# Patient Record
Sex: MALE | Race: WHITE | NOT HISPANIC OR LATINO | URBAN - METROPOLITAN AREA
[De-identification: names, ages, dates, MRNs, and addresses within clinical notes are randomized per-mention and may not be internally consistent; named-entity substitution may affect disease eponyms.]

---

## 2024-06-23 ENCOUNTER — EMERGENCY (EMERGENCY)
Facility: HOSPITAL | Age: 49
LOS: 1 days | Discharge: HOME | End: 2024-06-23
Attending: EMERGENCY MEDICINE | Admitting: EMERGENCY MEDICINE
Payer: COMMERCIAL

## 2024-06-23 VITALS
OXYGEN SATURATION: 94 % | RESPIRATION RATE: 18 BRPM | DIASTOLIC BLOOD PRESSURE: 78 MMHG | WEIGHT: 300.05 LBS | HEIGHT: 74 IN | SYSTOLIC BLOOD PRESSURE: 114 MMHG | TEMPERATURE: 98 F | HEART RATE: 103 BPM

## 2024-06-23 VITALS
OXYGEN SATURATION: 98 % | DIASTOLIC BLOOD PRESSURE: 74 MMHG | SYSTOLIC BLOOD PRESSURE: 133 MMHG | RESPIRATION RATE: 18 BRPM | HEART RATE: 69 BPM | TEMPERATURE: 98 F

## 2024-06-23 DIAGNOSIS — R55 SYNCOPE AND COLLAPSE: ICD-10-CM

## 2024-06-23 DIAGNOSIS — E86.0 DEHYDRATION: ICD-10-CM

## 2024-06-23 LAB
ADD ON TEST-SPECIMEN IN LAB: SIGNIFICANT CHANGE UP
ANION GAP SERPL CALC-SCNC: 11 MMOL/L — SIGNIFICANT CHANGE UP (ref 5–17)
ANION GAP SERPL CALC-SCNC: 11 MMOL/L — SIGNIFICANT CHANGE UP (ref 5–17)
BASOPHILS # BLD AUTO: 0.02 K/UL — SIGNIFICANT CHANGE UP (ref 0–0.2)
BASOPHILS NFR BLD AUTO: 0.2 % — SIGNIFICANT CHANGE UP (ref 0–2)
BUN SERPL-MCNC: 16 MG/DL — SIGNIFICANT CHANGE UP (ref 7–23)
BUN SERPL-MCNC: 16 MG/DL — SIGNIFICANT CHANGE UP (ref 7–23)
CALCIUM SERPL-MCNC: 8.5 MG/DL — SIGNIFICANT CHANGE UP (ref 8.4–10.5)
CALCIUM SERPL-MCNC: 8.8 MG/DL — SIGNIFICANT CHANGE UP (ref 8.4–10.5)
CHLORIDE SERPL-SCNC: 106 MMOL/L — SIGNIFICANT CHANGE UP (ref 96–108)
CHLORIDE SERPL-SCNC: 108 MMOL/L — SIGNIFICANT CHANGE UP (ref 96–108)
CO2 SERPL-SCNC: 22 MMOL/L — SIGNIFICANT CHANGE UP (ref 22–31)
CO2 SERPL-SCNC: 25 MMOL/L — SIGNIFICANT CHANGE UP (ref 22–31)
CREAT SERPL-MCNC: 1.58 MG/DL — HIGH (ref 0.5–1.3)
CREAT SERPL-MCNC: 1.85 MG/DL — HIGH (ref 0.5–1.3)
EGFR: 44 ML/MIN/1.73M2 — LOW
EGFR: 53 ML/MIN/1.73M2 — LOW
EOSINOPHIL # BLD AUTO: 0.02 K/UL — SIGNIFICANT CHANGE UP (ref 0–0.5)
EOSINOPHIL NFR BLD AUTO: 0.2 % — SIGNIFICANT CHANGE UP (ref 0–6)
GLUCOSE SERPL-MCNC: 108 MG/DL — HIGH (ref 70–99)
GLUCOSE SERPL-MCNC: 141 MG/DL — HIGH (ref 70–99)
HCT VFR BLD CALC: 45.9 % — SIGNIFICANT CHANGE UP (ref 39–50)
HGB BLD-MCNC: 14.7 G/DL — SIGNIFICANT CHANGE UP (ref 13–17)
IMM GRANULOCYTES NFR BLD AUTO: 0.6 % — SIGNIFICANT CHANGE UP (ref 0–0.9)
LYMPHOCYTES # BLD AUTO: 1.19 K/UL — SIGNIFICANT CHANGE UP (ref 1–3.3)
LYMPHOCYTES # BLD AUTO: 9 % — LOW (ref 13–44)
MCHC RBC-ENTMCNC: 26.7 PG — LOW (ref 27–34)
MCHC RBC-ENTMCNC: 32 GM/DL — SIGNIFICANT CHANGE UP (ref 32–36)
MCV RBC AUTO: 83.5 FL — SIGNIFICANT CHANGE UP (ref 80–100)
MONOCYTES # BLD AUTO: 0.86 K/UL — SIGNIFICANT CHANGE UP (ref 0–0.9)
MONOCYTES NFR BLD AUTO: 6.5 % — SIGNIFICANT CHANGE UP (ref 2–14)
NEUTROPHILS # BLD AUTO: 11.04 K/UL — HIGH (ref 1.8–7.4)
NEUTROPHILS NFR BLD AUTO: 83.5 % — HIGH (ref 43–77)
NRBC # BLD: 0 /100 WBCS — SIGNIFICANT CHANGE UP (ref 0–0)
PLATELET # BLD AUTO: 250 K/UL — SIGNIFICANT CHANGE UP (ref 150–400)
POTASSIUM SERPL-MCNC: 4.3 MMOL/L — SIGNIFICANT CHANGE UP (ref 3.5–5.3)
POTASSIUM SERPL-MCNC: 4.3 MMOL/L — SIGNIFICANT CHANGE UP (ref 3.5–5.3)
POTASSIUM SERPL-SCNC: 4.3 MMOL/L — SIGNIFICANT CHANGE UP (ref 3.5–5.3)
POTASSIUM SERPL-SCNC: 4.3 MMOL/L — SIGNIFICANT CHANGE UP (ref 3.5–5.3)
RBC # BLD: 5.5 M/UL — SIGNIFICANT CHANGE UP (ref 4.2–5.8)
RBC # FLD: 12.4 % — SIGNIFICANT CHANGE UP (ref 10.3–14.5)
SODIUM SERPL-SCNC: 141 MMOL/L — SIGNIFICANT CHANGE UP (ref 135–145)
SODIUM SERPL-SCNC: 142 MMOL/L — SIGNIFICANT CHANGE UP (ref 135–145)
TROPONIN T, HIGH SENSITIVITY RESULT: 11 NG/L — SIGNIFICANT CHANGE UP (ref 0–51)
WBC # BLD: 13.21 K/UL — HIGH (ref 3.8–10.5)
WBC # FLD AUTO: 13.21 K/UL — HIGH (ref 3.8–10.5)

## 2024-06-23 PROCEDURE — 80048 BASIC METABOLIC PNL TOTAL CA: CPT

## 2024-06-23 PROCEDURE — 99285 EMERGENCY DEPT VISIT HI MDM: CPT

## 2024-06-23 PROCEDURE — 82962 GLUCOSE BLOOD TEST: CPT

## 2024-06-23 PROCEDURE — 99284 EMERGENCY DEPT VISIT MOD MDM: CPT | Mod: 25

## 2024-06-23 PROCEDURE — 84484 ASSAY OF TROPONIN QUANT: CPT

## 2024-06-23 PROCEDURE — 85025 COMPLETE CBC W/AUTO DIFF WBC: CPT

## 2024-06-23 PROCEDURE — 36415 COLL VENOUS BLD VENIPUNCTURE: CPT

## 2024-06-23 RX ORDER — SODIUM CHLORIDE 9 MG/ML
1000 INJECTION INTRAMUSCULAR; INTRAVENOUS; SUBCUTANEOUS ONCE
Refills: 0 | Status: COMPLETED | OUTPATIENT
Start: 2024-06-23 | End: 2024-06-23

## 2024-06-23 RX ADMIN — SODIUM CHLORIDE 1000 MILLILITER(S): 9 INJECTION INTRAMUSCULAR; INTRAVENOUS; SUBCUTANEOUS at 18:11

## 2024-06-23 NOTE — ED ADULT NURSE NOTE - OBJECTIVE STATEMENT
The patient is a 49y Male complaining of syncope. Pt is A&Ox 4 , speaking in full sentences, respirations unlabored. Pt reports syncopal-like episode at restaurant today, witnessed by friend who helped patient to a sitting position from standing, pt endorses feeling dizzy at the restaurant prior to episode. Pt denies head strike, use of blood thinners. no Pmhx. Pt now states " I feel fine". Pt denies CP, SOB, dizziness, changes in vision/speech, unilateral weakness, F/C, N/V/D.

## 2024-06-23 NOTE — ED PROVIDER NOTE - ATTENDING APP SHARED VISIT CONTRIBUTION OF CARE
The pt is a 48 y/o M, BIBA s/p passing out -- pt was walking around in the sun/heat all day, drank little water, then went to eat lunch and felt "lightheaded" - defines as "room vibrating", then went to stand up and according to friend got pale and diaphoretic - friend sat him back down, he was "out for a few sec" then came back. Currently feeling "tired". Denies cp, sob, n/v/d, abd pain, h/a, head trauma, fevers, chills. No PMH    Pt well appearing in ED  VSS  EKG nonischemic  Initial set of labs with cr 1.85, hydrated and repeated with improvement  Trop x 2 stable  Pt feeling much better  Symptoms most likely vasovagal/heat related  Will follow up with pmd  Understands return precautions and follow up

## 2024-06-23 NOTE — ED PROVIDER NOTE - OBJECTIVE STATEMENT
The pt is a 50 y/o M, BIBA s/p passing out -- pt was walking around in the sun/heat all day, drank little water, then went to eat lunch and felt "lightheaded" - defines as "room vibrating", then went to stand up and according to friend got pale and diaphoretic - friend sat him back down, he was "out for a few sec" then came back. Currently feeling "tired". Denies cp, sob, n/v/d, abd pain, h/a, head trauma, fevers, chills. No PMH

## 2024-06-23 NOTE — ED PROVIDER NOTE - NSFOLLOWUPINSTRUCTIONS_ED_ALL_ED_FT
Dehydration, Adult  Dehydration is a condition in which there is not enough water or other fluids in the body. This happens when a person loses more fluids than they take in. Important organs cannot work right without the right amount of fluids. Any loss of fluids from the body can cause dehydration.  Dehydration can be mild, worse, or very bad. It should be treated right away to keep it from getting very bad.  What are the causes?  Conditions that cause loss of water in the body. They include:  Watery poop (diarrhea).  Vomiting.  Sweating a lot.  Fever.  Infection.  Peeing (urinating) a lot.  Not drinking enough fluids.  Certain medicines, such as medicines that take extra fluid out of the body (diuretics).  Lack of safe drinking water.  Not being able to get enough water and food.  What increases the risk?  Having a long-term (chronic) illness that has not been treated the right way, such as:  Diabetes.  Heart disease.  Kidney disease.  Being 65 years of age or older.  Having a disability.  Living in a place that is high above the ground or sea (high in altitude). The thinner, drier air causes more fluid loss.  Doing exercises that put stress on your body for a long time.  Being active when in hot places.  What are the signs or symptoms?  Symptoms of dehydration depend on how bad it is.  Mild or worse dehydration  Thirst.  Dry lips or dry mouth.  Feeling dizzy or light-headed.  Muscle cramps.  Passing little pee or dark pee. Pee may be the color of tea.  Headache.  Very bad dehydration  Changes in skin. Skin may:  Be cold to the touch (clammy).  Be blotchy or pale.  Not go back to normal right after you pinch it and let it go.  Little or no tears, pee, or sweat.  Fast breathing.  Low blood pressure.  Weak pulse.  Pulse that is more than 100 beats a minute when you are sitting still.  Other changes, such as:  Feeling very thirsty.  Eyes that look hollow (sunken).  Cold hands and feet.  Being confused.  Being very tired (lethargic) or having trouble waking from sleep.  Losing weight.  Loss of consciousness.  How is this treated?  A person's hand, showing an inserted IV catheter.   Treatment for this condition depends on how bad your dehydration is. Treatment should start right away. Do not wait until your condition gets very bad. Very bad dehydration is an emergency. You will need to go to a hospital.  Mild or worse dehydration can be treated at home. You may be asked to:  Drink more fluids.  Drink an oral rehydration solution (ORS). This drink gives you the right amount of fluids, salts, and minerals (electrolytes).  Very bad dehydration can be treated:  With fluids through an IV tube.  By correcting low levels of electrolytes in the body.  By treating the problem that caused your dehydration.  Follow these instructions at home:  Oral rehydration solution  A glass of water with a spoonful of ORS ready to be added to it.  If told by your doctor, drink an ORS:  Make an ORS. Use instructions on the package.  Start by drinking small amounts, about ½ cup (120 mL) every 5–10 minutes.  Slowly drink more until you have had the amount that your doctor said to have.  Eating and drinking  A person drinking water from a glass.   Drink enough clear fluid to keep your pee pale yellow. If you were told to drink an ORS, finish the ORS first. Then, start slowly drinking other clear fluids. Drink fluids such as:  Water. Do not drink only water. Doing that can make the salt (sodium) level in your body get too low.  Water from ice chips you suck on.  Fruit juice that you have added water to (diluted).  Low-calorie sports drinks.  Eat foods that have the right amounts of salts and minerals, such as bananas, oranges, potatoes, tomatoes, or spinach.  Do not drink alcohol.  Avoid drinks that have caffeine or sugar. These include::  High-calorie sports drinks.  Fruit juice that you did not add water to.  Soda.  Coffee or energy drinks.  Avoid foods that are greasy or have a lot of fat or sugar.  General instructions  Take over-the-counter and prescription medicines only as told by your doctor.  Do not take sodium tablets. Doing that can make the salt level in your body get too high.  Return to your normal activities as told by your doctor. Ask your doctor what activities are safe for you.  Keep all follow-up visits. Your doctor may check and change your treatment.  Contact a doctor if:  You have pain in your belly (abdomen) and the pain:  Gets worse.  Stays in one place.  You have a rash.  You have a stiff neck.  You get angry or annoyed more easily than normal.  You are more tired or have a harder time waking than normal.  You feel weak or dizzy.  You feel very thirsty.  Get help right away if:  You have any symptoms of very bad dehydration.  You vomit every time you eat or drink.  Your vomiting gets worse, does not go away, or you vomit blood or green stuff.  You are getting treatment, but symptoms are getting worse.  You have a fever.  You have a very bad headache.  You have:  Diarrhea that gets worse or does not go away.  Blood in your poop (stool). This may cause poop to look black and tarry.  No pee in 6–8 hours.  Only a small amount of pee in 6–8 hours, and the pee is very dark.  You have trouble breathing.  These symptoms may be an emergency. Get help right away. Call 911.  Do not wait to see if the symptoms will go away.  Do not drive yourself to the hospital.  Syncope  Syncope is when you temporarily lose consciousness, also called fainting or passing out. It is caused by a sudden decrease in blood flow to the brain. Even though most causes of syncope are not dangerous, syncope can possibly be a sign of a serious medical problem. Signs that you may be about to faint include feeling dizzy, lightheaded, nausea, visual changes, or cold/clammy skin. Do not drive, operate heavy machinery, or play sports until your health care provider says it is okay.  SEEK IMMEDIATE MEDICAL CARE IF YOU HAVE ANY OF THE FOLLOWING SYMPTOMS: severe headache, pain in your chest/abdomen/back, bleeding from your mouth or rectum, palpitations, shortness of breath, pain with breathing, seizure, confusion, or trouble walking.

## 2024-06-23 NOTE — ED ADULT NURSE NOTE - NSFALLHARMRISKINTERV_ED_ALL_ED

## 2024-06-23 NOTE — ED ADULT TRIAGE NOTE - OTHER COMPLAINTS
Per EMS, patient had witnessed syncopal episode in restaurant. Patient denies head injury or chest pain. EMS reports orthostatic changes in vital signs, BP 67/40 when standing. IV#18g placed in left antecubital by EMS, patient received IV fluids.

## 2024-06-23 NOTE — ED PROVIDER NOTE - PATIENT PORTAL LINK FT
You can access the FollowMyHealth Patient Portal offered by [facility] by registering at the following website: [email or web address]. By joining [facility]’s FollowMyHealth portal, you will also be able to view your health information using other applications (apps) compatible with our system.

## 2024-06-23 NOTE — ED PROVIDER NOTE - CLINICAL SUMMARY MEDICAL DECISION MAKING FREE TEXT BOX
pt was out in the sun/heat all day - drank very little water, then had a vasovagal episode, no head trauma/loc, no pmh, ekg nsr, clinically dehydrated hence given ivf, labs confirm dehydration - trop neg x2, symptomatically improved w/ivf, po hydration discussed at length, hemodynamically stable for dc, pt understands and agrees w/plan, strict return precautions given